# Patient Record
Sex: FEMALE | NOT HISPANIC OR LATINO | URBAN - METROPOLITAN AREA
[De-identification: names, ages, dates, MRNs, and addresses within clinical notes are randomized per-mention and may not be internally consistent; named-entity substitution may affect disease eponyms.]

---

## 2017-06-30 ENCOUNTER — IMPORTED ENCOUNTER (OUTPATIENT)
Dept: URBAN - METROPOLITAN AREA CLINIC 1 | Facility: CLINIC | Age: 77
End: 2017-06-30

## 2017-06-30 PROBLEM — H16.143: Noted: 2017-06-30

## 2017-06-30 PROBLEM — H04.123: Noted: 2017-06-30

## 2017-06-30 PROBLEM — H40.023: Noted: 2017-06-30

## 2017-06-30 PROCEDURE — 92250 FUNDUS PHOTOGRAPHY W/I&R: CPT

## 2017-06-30 PROCEDURE — 83861 MICROFLUID ANALY TEARS: CPT

## 2017-06-30 PROCEDURE — 92015 DETERMINE REFRACTIVE STATE: CPT

## 2017-06-30 PROCEDURE — 92004 COMPRE OPH EXAM NEW PT 1/>: CPT

## 2017-06-30 NOTE — PATIENT DISCUSSION
1.  DIONE w/ PEK OU- H/o Parkinson's Disease. Tear Lab Advanced OU today. Begin ATs QID OU Routinely. Consider Plugs w/o improvement. 2.  Glaucoma Suspect OU : (CD 0.65 OU) Neg Fm Hx. Risk of cupping. Disc photos today show disc cupping. Pt to return for baseline testing. Patient is considered high risk 3. Pseudophakia OU (Not PMG) 4. Refraction fee discussed and pt agrees to non-covered serviceLetter to PCP MRx for glasses Return for an appointment in 6 mo 10 OCT/ VF 24-2 OU with Dr. Greg Casper.

## 2022-04-02 ASSESSMENT — TONOMETRY
OS_IOP_MMHG: 12
OD_IOP_MMHG: 13

## 2022-04-02 ASSESSMENT — KERATOMETRY
OD_K2POWER_DIOPTERS: 44.00
OD_AXISANGLE_DEGREES: 162
OD_AXISANGLE2_DEGREES: 072
OS_K2POWER_DIOPTERS: 44.25
OD_K1POWER_DIOPTERS: 44.75
OS_AXISANGLE2_DEGREES: 107
OS_AXISANGLE_DEGREES: 017
OS_K1POWER_DIOPTERS: 44.75

## 2022-04-02 ASSESSMENT — VISUAL ACUITY
OS_CC: 20/30
OD_CC: 20/40